# Patient Record
Sex: FEMALE | Race: WHITE | NOT HISPANIC OR LATINO | Employment: OTHER | URBAN - NONMETROPOLITAN AREA
[De-identification: names, ages, dates, MRNs, and addresses within clinical notes are randomized per-mention and may not be internally consistent; named-entity substitution may affect disease eponyms.]

---

## 2024-11-15 ENCOUNTER — OFFICE VISIT (OUTPATIENT)
Dept: FAMILY MEDICINE CLINIC | Facility: CLINIC | Age: 69
End: 2024-11-15

## 2024-11-15 ENCOUNTER — TELEPHONE (OUTPATIENT)
Dept: FAMILY MEDICINE CLINIC | Facility: CLINIC | Age: 69
End: 2024-11-15

## 2024-11-15 VITALS
WEIGHT: 139.9 LBS | OXYGEN SATURATION: 95 % | SYSTOLIC BLOOD PRESSURE: 128 MMHG | HEIGHT: 60 IN | TEMPERATURE: 97.3 F | DIASTOLIC BLOOD PRESSURE: 72 MMHG | RESPIRATION RATE: 18 BRPM | HEART RATE: 73 BPM | BODY MASS INDEX: 27.47 KG/M2

## 2024-11-15 DIAGNOSIS — G47.00 INSOMNIA, UNSPECIFIED TYPE: ICD-10-CM

## 2024-11-15 DIAGNOSIS — Z79.899 ENCOUNTER FOR LONG-TERM CURRENT USE OF MEDICATION: ICD-10-CM

## 2024-11-15 DIAGNOSIS — I10 PRIMARY HYPERTENSION: ICD-10-CM

## 2024-11-15 DIAGNOSIS — Z76.89 ENCOUNTER TO ESTABLISH CARE: Primary | ICD-10-CM

## 2024-11-15 DIAGNOSIS — F33.0 MILD EPISODE OF RECURRENT MAJOR DEPRESSIVE DISORDER: ICD-10-CM

## 2024-11-15 LAB
AMPHET+METHAMPHET UR QL: NEGATIVE
AMPHETAMINE INTERNAL CONTROL: NORMAL
AMPHETAMINES UR QL: NEGATIVE
BARBITURATE INTERNAL CONTROL: NORMAL
BARBITURATES UR QL SCN: NEGATIVE
BENZODIAZ UR QL SCN: NEGATIVE
BENZODIAZEPINE INTERNAL CONTROL: NORMAL
BUPRENORPHINE INTERNAL CONTROL: NORMAL
BUPRENORPHINE SERPL-MCNC: NEGATIVE NG/ML
CANNABINOIDS SERPL QL: NEGATIVE
COCAINE INTERNAL CONTROL: NORMAL
COCAINE UR QL: NEGATIVE
EXPIRATION DATE: NORMAL
Lab: NORMAL
MDMA (ECSTASY) INTERNAL CONTROL: NORMAL
MDMA UR QL SCN: NEGATIVE
METHADONE INTERNAL CONTROL: NORMAL
METHADONE UR QL SCN: NEGATIVE
METHAMPHETAMINE INTERNAL CONTROL: NORMAL
MORPHINE INTERNAL CONTROL: NORMAL
MORPHINE/OPIATES SCREEN, URINE: NEGATIVE
OXYCODONE INTERNAL CONTROL: NORMAL
OXYCODONE UR QL SCN: NEGATIVE
PCP UR QL SCN: NEGATIVE
PHENCYCLIDINE INTERNAL CONTROL: NORMAL
THC INTERNAL CONTROL: NORMAL

## 2024-11-15 RX ORDER — ZOLPIDEM TARTRATE 5 MG/1
5 TABLET ORAL NIGHTLY PRN
Qty: 30 TABLET | Refills: 2 | Status: SHIPPED | OUTPATIENT
Start: 2024-11-15

## 2024-11-15 RX ORDER — LISINOPRIL 10 MG/1
10 TABLET ORAL DAILY
COMMUNITY
End: 2024-11-15 | Stop reason: SDUPTHER

## 2024-11-15 RX ORDER — FLUOXETINE 10 MG/1
10 CAPSULE ORAL DAILY
COMMUNITY
End: 2024-11-15 | Stop reason: SDUPTHER

## 2024-11-15 RX ORDER — LISINOPRIL 10 MG/1
10 TABLET ORAL DAILY
Qty: 90 TABLET | Refills: 1 | Status: SHIPPED | OUTPATIENT
Start: 2024-11-15

## 2024-11-15 RX ORDER — FLUOXETINE 10 MG/1
10 CAPSULE ORAL DAILY
Qty: 90 CAPSULE | Refills: 1 | Status: SHIPPED | OUTPATIENT
Start: 2024-11-15

## 2024-11-15 RX ORDER — ZOLPIDEM TARTRATE 10 MG/1
10 TABLET ORAL NIGHTLY PRN
COMMUNITY
End: 2024-11-15 | Stop reason: SDUPTHER

## 2024-11-15 NOTE — ASSESSMENT & PLAN NOTE
Insomnia is chronic and stable on current therapy.  She has been on Ambien for approximately 10 years without issues such as sleepwalking. However, due to the increased risk of negative side effects in patients over 65, a sleep study was recommended to ensure safety and explore other potential underlying issues. She was informed that a home sleep study might be an option depending on her insurance. A trial of a lower dose of Ambien was suggested. A urine drug screen was also ordered.

## 2024-11-15 NOTE — ASSESSMENT & PLAN NOTE
Pression is chronic and stable on current therapy.  She is on fluoxetine, which has been effective without causing side effects. She is advised to continue her current dose.

## 2024-11-15 NOTE — TELEPHONE ENCOUNTER
----- Message from Delia Galeas sent at 11/15/2024 12:49 PM EST -----  Please contact patient to schedule a 3-month follow-up for annual physical and refills.

## 2024-11-15 NOTE — ASSESSMENT & PLAN NOTE
Hypertension is chronic and stable on current therapy.  She is currently on lisinopril, which has been effective in controlling her blood pressure. Although her blood pressure was slightly elevated today, it was attributed to anxiety about the visit. Upon rechecking, her blood pressure was 128/72. She is advised to continue her current medication regimen.

## 2024-11-15 NOTE — PROGRESS NOTES
New Patient Office Visit      Date: 11/15/2024   Patient Name: Sadia Orozco  : 1955   MRN: [id number]     Chief Complaint:    Chief Complaint   Patient presents with    Establish Care       Subjective    Patient or patient representative verbalized consent for the use of Ambient Listening during the visit with  Delia Galeas PA-C for chart documentation. 11/15/2024  09:37 EST      History of Present Illness: Sadia Orozco is a 69 y.o. female who is here today to establish care. She has been a patient of Dr. Camacho previously.      She has been diagnosed with insomnia and has been taking Ambien for approximately 10 years. She has not undergone a sleep study.    She is currently on fluoxetine depression and adjustment, and she reports is effective for her.    She has a history of low blood pressure, which was previously managed with lisinopril.     Her last lab tests were conducted in 2024, and she typically has her labs checked annually.    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      Review of Systems:   Review of Systems   Constitutional:  Negative for activity change, appetite change, fatigue, fever, unexpected weight gain and unexpected weight loss.   HENT:  Negative for congestion, ear discharge, ear pain, postnasal drip, rhinorrhea, sinus pressure, sneezing, sore throat, trouble swallowing and voice change.    Eyes:  Negative for blurred vision, double vision, photophobia, pain, itching and visual disturbance.   Respiratory:  Negative for apnea, cough, chest tightness, shortness of breath and wheezing.    Cardiovascular:  Negative for chest pain, palpitations and leg swelling.   Gastrointestinal:  Negative for abdominal pain, blood in stool, constipation, diarrhea, nausea, vomiting and GERD.   Endocrine: Negative for cold intolerance, heat intolerance, polydipsia and polyuria.   Genitourinary:  Negative for decreased urine volume, difficulty urinating,  dysuria, flank pain, frequency, hematuria and urinary incontinence.   Musculoskeletal:  Negative for arthralgias, back pain, gait problem, joint swelling and myalgias.   Skin:  Negative for rash, skin lesions and wound.   Allergic/Immunologic: Negative for environmental allergies and food allergies.   Neurological:  Negative for dizziness, syncope, weakness, light-headedness, numbness and headache.   Hematological:  Negative for adenopathy. Does not bruise/bleed easily.   Psychiatric/Behavioral:  Negative for decreased concentration, dysphoric mood, sleep disturbance, depressed mood and stress. The patient is not nervous/anxious.        Past Medical History:   Past Medical History:   Diagnosis Date    Asthma     Hypertension        Past Surgical History:   Past Surgical History:   Procedure Laterality Date    BREAST SURGERY Right     mass removed, benign    CHOLECYSTECTOMY      HYSTERECTOMY         Family History:   Family History   Problem Relation Age of Onset    Lung cancer Mother     Heart defect Father        Social History:   Social History     Socioeconomic History    Marital status:    Tobacco Use    Smoking status: Never    Smokeless tobacco: Never   Vaping Use    Vaping status: Never Used   Substance and Sexual Activity    Alcohol use: Never    Drug use: Never       Medications:     Current Outpatient Medications:     FLUoxetine (PROzac) 10 MG capsule, Take 1 capsule by mouth Daily., Disp: 90 capsule, Rfl: 1    lisinopril (PRINIVIL,ZESTRIL) 10 MG tablet, Take 1 tablet by mouth Daily., Disp: 90 tablet, Rfl: 1    zolpidem (AMBIEN) 10 MG tablet, Take 1 tablet by mouth At Night As Needed for Sleep., Disp: , Rfl:     Allergies:   Allergies   Allergen Reactions    Penicillins Anaphylaxis    Codeine Hives    Latex, Natural Rubber Itching       Objective     Physical Exam:  Vital Signs:   Vitals:    11/15/24 0858 11/15/24 1134   BP: 142/86 128/72  Comment: 09:50 AM   BP Location: Left arm Left arm   Patient  "Position: Sitting Sitting   Cuff Size: Adult Adult   Pulse: 73    Resp: 18    Temp: 97.3 °F (36.3 °C)    TempSrc: Temporal    SpO2: 95%    Weight: 63.5 kg (139 lb 14.4 oz)    Height: 151.9 cm (59.8\")    PainSc: 0-No pain      Body mass index is 27.51 kg/m².       Physical Exam  Vitals and nursing note reviewed.   Constitutional:       General: She is not in acute distress.     Appearance: Normal appearance. She is not ill-appearing.   HENT:      Head: Normocephalic and atraumatic.      Right Ear: Tympanic membrane, ear canal and external ear normal.      Left Ear: Tympanic membrane, ear canal and external ear normal.      Nose: Nose normal.      Mouth/Throat:      Mouth: Mucous membranes are moist.      Pharynx: Oropharynx is clear. No oropharyngeal exudate or posterior oropharyngeal erythema.   Eyes:      Extraocular Movements: Extraocular movements intact.      Conjunctiva/sclera: Conjunctivae normal.      Pupils: Pupils are equal, round, and reactive to light.   Cardiovascular:      Rate and Rhythm: Normal rate and regular rhythm.      Heart sounds: Normal heart sounds.   Pulmonary:      Effort: Pulmonary effort is normal.      Breath sounds: Normal breath sounds.   Abdominal:      General: Bowel sounds are normal.      Palpations: Abdomen is soft. There is no mass.      Tenderness: There is no abdominal tenderness. There is no right CVA tenderness, left CVA tenderness or guarding.      Hernia: No hernia is present.   Musculoskeletal:      Cervical back: Normal range of motion and neck supple.      Right lower leg: No edema.      Left lower leg: No edema.   Lymphadenopathy:      Cervical: No cervical adenopathy.   Skin:     General: Skin is warm.   Neurological:      General: No focal deficit present.      Mental Status: She is alert and oriented to person, place, and time.      Motor: No weakness.      Coordination: Coordination normal.      Gait: Gait normal.   Psychiatric:         Mood and Affect: Mood normal. "         Behavior: Behavior normal.       Labs:    Recent Results (from the past 24 hours)   POC Medline 12 Panel Urine Drug Screen    Collection Time: 11/15/24 11:39 AM    Specimen: Urine   Result Value Ref Range    Amphetamine Screen, Urine Negative Negative    AMP INTERNAL CONTROL Passed Passed    Barbiturates Screen, Urine Negative Negative    BARBITURATE INTERNAL CONTROL Passed Passed    Buprenorphine, Screen, Urine Negative Negative    BUPRENORPHINE INTERNAL CONTROL Passed Passed    Benzodiazepine Screen, Urine Negative Negative    BENZODIAZEPINE INTERNAL CONTROL Passed Passed    Cocaine Screen, Urine Negative Negative    COCAINE INTERNAL CONTROL Passed Passed    MDMA (ECSTASY) Negative Negative    MDMA (ECSTASY) INTERNAL CONTROL Passed Passed    Methamphetamine, Ur Negative Negative    METHAMPHETAMINE INTERNAL CONTROL Passed Passed    Morphine/Opiates Screen, Urine Negative Negative    MOR INTERNAL CONTROL Passed Passed    Methadone Screen, Urine Negative Negative    METHADONE INTERNAL CONTROL Passed Passed    Oxycodone Screen, Urine Negative Negative    OXYCODONE INTERNAL CONTROL Passed Passed    Phencyclidine (PCP), Urine Negative Negative    PHENCYCLIDINE INTERNAL CONTROL Passed Passed    THC, Screen, Urine Negative Negative    THC INTERNAL CONTROL Passed Passed    Lot Number [id number]     Expiration Date 04/17/2026           Assessment / Plan      Assessment/Plan:     Diagnoses and all orders for this visit:    1. Encounter to establish care (Primary)  Comments:  Records will be requested from Dr. Camacho.    2. Primary hypertension  Assessment & Plan:  Hypertension is chronic and stable on current therapy.  She is currently on lisinopril, which has been effective in controlling her blood pressure. Although her blood pressure was slightly elevated today, it was attributed to anxiety about the visit. Upon rechecking, her blood pressure was 128/72. She is advised to continue her current medication  regimen.    Orders:  -     lisinopril (PRINIVIL,ZESTRIL) 10 MG tablet; Take 1 tablet by mouth Daily.  Dispense: 90 tablet; Refill: 1    3. Insomnia, unspecified type  Assessment & Plan:  Insomnia is chronic and stable on current therapy.  She has been on Ambien for approximately 10 years without issues such as sleepwalking. However, due to the increased risk of negative side effects in patients over 65, a sleep study was recommended to ensure safety and explore other potential underlying issues. She was informed that a home sleep study might be an option depending on her insurance. A trial of a lower dose of Ambien was suggested. A urine drug screen was also ordered.    Orders:  -     Ambulatory Referral to Sleep Medicine    4. Mild episode of recurrent major depressive disorder  Assessment & Plan:  Patricia is chronic and stable on current therapy.  She is on fluoxetine, which has been effective without causing side effects. She is advised to continue her current dose.    Orders:  -     FLUoxetine (PROzac) 10 MG capsule; Take 1 capsule by mouth Daily.  Dispense: 90 capsule; Refill: 1    5. Encounter for long-term current use of medication  Overview:  Controlled substance agreement signed on 11/15/2024    UDS up-to-date as of 11/15/24.    Assessment & Plan:  Inocencio reviewed and compliant.  Request #[id number]    Orders:  -     POC Medline 12 Panel Urine Drug Screen    Health Maintenance.  She declined the flu shot during the visit.     Follow Up:   Return in about 3 months (around 2/15/2025) for Annual Physical.    Delia Galeas PA-C  [facility]